# Patient Record
(demographics unavailable — no encounter records)

---

## 2024-11-05 NOTE — HISTORY OF PRESENT ILLNESS
[de-identified] : The patient is a 14 year  old right hand dominant female who presents today complaining of right ankle pain.  Date of Injury/Onset: 11/3/24 Pain:    At Rest: 0/10  With Activity:  8.5/10  Mechanism of injury: While playing basketball she landed and felt her ankle roll and felt a pop  This is NOT a Work Related Injury being treated under Worker's Compensation. This is NOT an athletic injury occurring associated with an interscholastic or organized sports team. Quality of symptoms: sharp lateral and medial ankle pain, swelling  Improves with: rest Worse with: weight bearing, plantarflexion, dorsiflexion  Prior treatment: none - using crutches to ambulate  Prior Imaging: none Out of work/sport: Out of sports since 11/3/24 School/Sport/Position/Occupation: [employer] basketball, soccer  Additional Information: mild R ankle sprain 2022

## 2024-11-05 NOTE — IMAGING
[Right] : right ankle [Weight -] : weightbearing [de-identified] : The patient is a well appearing 14 year  old female of their stated age. Patient ambulates with ASSISTANCE OF CRUTCHES Negative straight leg raise.   Effected Foot and Ankle: RIGHT   Inspection: Erythema: None Ecchymosis: None Abrasions: None Effusion: None Deformity: None Pes [place] Valgus: Negative Pes Cavus: Negative   Palpation: Crepitus: None Medial Malleolus: Nontender Lateral Malleolus: Nontender Syndesmosis: TENDER  Proximal Fibula: Nontender ATFL: TENDER  CFL: TENDER  Deltoid: TENDER  Calcaneus: Nontender Talar Head/Neck: Nontender Peroneal Tendons: Nontender Posterior Tibialis: Nontender Achilles Tendon: Nontender & Intact Anterior Capsule: Nontender Hindfoot: Nontender Midfoot: Nontender Forefoot: Nontender   ROM: Ankle Dorsiflexion: 30 degrees Ankle Plantar Flexion: 45 degrees Motor: Dorsiflexion: 5 out of 5 Plantar Flexion: 5 out of 5 Inversion: 5  out of 5 Eversion: 5 out of 5 EHL: 5 out of 5 FHL: 5 out of 5   Provocative Testing: Anterior Drawer: Negative Syndesmosis Squeeze Test: POSITIVE  Le's Test: Negative Midfoot Stability/Rotation: Negative Heel Raise Inversion: Normal Triple Hop: POSITIVE  Neurologic Exam: L4-S1 Sensation: Grossly Intact Vascular Exam/Pulses: Dorsalis Pedis: 2+ Posterior Tibialis: 2+ Capillary Refill: <2 Seconds Other Exams: None   Pertinent Contralateral Findings: None   Assessment: The patient is a 14-year-old female with right ankle pain and radiographic and physical exam findings consistent with high ankle sprain. The patients condition is acute Documents/Results Reviewed Today: X-Ray right ankle to include stress view  Tests/Studies Independently Interpreted Today: X-Ray right ankle to include stress view is benign, no widening of mortis. Pertinent findings include: tender ATFL, tender CFL, tender deltoid, tender syndesmosis, +triple hop, +syndesmosis squeeze test Confounding medical conditions/concerns: None   Plan: Start physical therapy, HEP, and stretching for high ankle sprain. Recommended the patient obtain a Reparel ankle sleeve and topical Voltaren gel to aid in inflammatory process. The patient will begin use of long Pneumatic CAM boot to ensure stability and proper healing of high ankle sprain. At this time, the patient has no weight bearing restrictions. Discussed appropriate use of OTC anti-inflammatories as needed for pain, inflammation, and discomfort - use as directed and take with food. The patient will follow up in 1 weeks to discuss possible transition into lace-up ankle brace given upcoming basketball try outs in 2 weeks. Until then, the patient is out of gym/sport specific activity. Will monitor on a week to week basis. Modify activity as discussed. Tests Ordered: None  Prescription Medications Ordered: Discussed appropriate use of OTC Aleve one in the morning, two at night Braces/DME Ordered: long pneumatic CAM boot  Activity/Work/Sports Status: Shut down  Additional Instructions: None Follow-Up: 10 days  The patient's current medication management of their orthopedic diagnosis was reviewed today: (1) We discussed a comprehensive treatment plan that included possible pharmaceutical management involving the use of prescription strength medications including but not limited to options such as oral Naprosyn 500mg BID, once daily Meloxicam 15 mg, or 500-650 mg Tylenol versus over the counter oral medications and topical prescription NSAID Pennsaid vs over the counter Voltaren gel.  Based on our extensive discussion, the patient declined prescription medication and will use over the counter Advil, Alleve, Voltaren Gel or Tylenol as directed. (2) There is a moderate risk of morbidity with further treatment, especially from use of prescription strength medications and possible side effects of these medications which include upset stomach with oral medications, skin reactions to topical medications and cardiac/renal issues with long term use. (3) I recommended that the patient follow-up with their medical physician to discuss any significant specific potential issues with long term medication use such as interactions with current medications or with exacerbation of underlying medical comorbidities. (4) The benefits and risks associated with use of injectable, oral or topical, prescription and over the counter anti-inflammatory medications were discussed with the patient. The patient voiced understanding of the risks including but not limited to bleeding, stroke, kidney dysfunction, heart disease, and were referred to the black box warning label for further information.   Katharine PEREZ attest that this documentation has been prepared under the direction and in the presence of Provider Dr. Santy Dumont.   The documentation recorded by the scribe accurately reflects the services IDr. Santy, personally performed and the decisions made by me. [FreeTextEntry9] : X-Ray right ankle to include stress view is benign, no widening of mortis.

## 2024-11-05 NOTE — IMAGING
[Right] : right ankle [Weight -] : weightbearing [de-identified] : The patient is a well appearing 14 year  old female of their stated age. Patient ambulates with ASSISTANCE OF CRUTCHES Negative straight leg raise.   Effected Foot and Ankle: RIGHT   Inspection: Erythema: None Ecchymosis: None Abrasions: None Effusion: None Deformity: None Pes [place] Valgus: Negative Pes Cavus: Negative   Palpation: Crepitus: None Medial Malleolus: Nontender Lateral Malleolus: Nontender Syndesmosis: TENDER  Proximal Fibula: Nontender ATFL: TENDER  CFL: TENDER  Deltoid: TENDER  Calcaneus: Nontender Talar Head/Neck: Nontender Peroneal Tendons: Nontender Posterior Tibialis: Nontender Achilles Tendon: Nontender & Intact Anterior Capsule: Nontender Hindfoot: Nontender Midfoot: Nontender Forefoot: Nontender   ROM: Ankle Dorsiflexion: 30 degrees Ankle Plantar Flexion: 45 degrees Motor: Dorsiflexion: 5 out of 5 Plantar Flexion: 5 out of 5 Inversion: 5  out of 5 Eversion: 5 out of 5 EHL: 5 out of 5 FHL: 5 out of 5   Provocative Testing: Anterior Drawer: Negative Syndesmosis Squeeze Test: POSITIVE  Le's Test: Negative Midfoot Stability/Rotation: Negative Heel Raise Inversion: Normal Triple Hop: POSITIVE  Neurologic Exam: L4-S1 Sensation: Grossly Intact Vascular Exam/Pulses: Dorsalis Pedis: 2+ Posterior Tibialis: 2+ Capillary Refill: <2 Seconds Other Exams: None   Pertinent Contralateral Findings: None   Assessment: The patient is a 14-year-old female with right ankle pain and radiographic and physical exam findings consistent with high ankle sprain. The patients condition is acute Documents/Results Reviewed Today: X-Ray right ankle to include stress view  Tests/Studies Independently Interpreted Today: X-Ray right ankle to include stress view is benign, no widening of mortis. Pertinent findings include: tender ATFL, tender CFL, tender deltoid, tender syndesmosis, +triple hop, +syndesmosis squeeze test Confounding medical conditions/concerns: None   Plan: Start physical therapy, HEP, and stretching for high ankle sprain. Recommended the patient obtain a Reparel ankle sleeve and topical Voltaren gel to aid in inflammatory process. The patient will begin use of long Pneumatic CAM boot to ensure stability and proper healing of high ankle sprain. At this time, the patient has no weight bearing restrictions. Discussed appropriate use of OTC anti-inflammatories as needed for pain, inflammation, and discomfort - use as directed and take with food. The patient will follow up in 1 weeks to discuss possible transition into lace-up ankle brace given upcoming basketball try outs in 2 weeks. Until then, the patient is out of gym/sport specific activity. Will monitor on a week to week basis. Modify activity as discussed. Tests Ordered: None  Prescription Medications Ordered: Discussed appropriate use of OTC Aleve one in the morning, two at night Braces/DME Ordered: long pneumatic CAM boot  Activity/Work/Sports Status: Shut down  Additional Instructions: None Follow-Up: 10 days  The patient's current medication management of their orthopedic diagnosis was reviewed today: (1) We discussed a comprehensive treatment plan that included possible pharmaceutical management involving the use of prescription strength medications including but not limited to options such as oral Naprosyn 500mg BID, once daily Meloxicam 15 mg, or 500-650 mg Tylenol versus over the counter oral medications and topical prescription NSAID Pennsaid vs over the counter Voltaren gel.  Based on our extensive discussion, the patient declined prescription medication and will use over the counter Advil, Alleve, Voltaren Gel or Tylenol as directed. (2) There is a moderate risk of morbidity with further treatment, especially from use of prescription strength medications and possible side effects of these medications which include upset stomach with oral medications, skin reactions to topical medications and cardiac/renal issues with long term use. (3) I recommended that the patient follow-up with their medical physician to discuss any significant specific potential issues with long term medication use such as interactions with current medications or with exacerbation of underlying medical comorbidities. (4) The benefits and risks associated with use of injectable, oral or topical, prescription and over the counter anti-inflammatory medications were discussed with the patient. The patient voiced understanding of the risks including but not limited to bleeding, stroke, kidney dysfunction, heart disease, and were referred to the black box warning label for further information.   Katharine PEREZ attest that this documentation has been prepared under the direction and in the presence of Provider Dr. Santy Dumotn.   The documentation recorded by the scribe accurately reflects the services IDr. Santy, personally performed and the decisions made by me. [FreeTextEntry9] : X-Ray right ankle to include stress view is benign, no widening of mortis.

## 2024-11-05 NOTE — HISTORY OF PRESENT ILLNESS
[de-identified] : The patient is a 14 year  old right hand dominant female who presents today complaining of right ankle pain.  Date of Injury/Onset: 11/3/24 Pain:    At Rest: 0/10  With Activity:  8.5/10  Mechanism of injury: While playing basketball she landed and felt her ankle roll and felt a pop  This is NOT a Work Related Injury being treated under Worker's Compensation. This is NOT an athletic injury occurring associated with an interscholastic or organized sports team. Quality of symptoms: sharp lateral and medial ankle pain, swelling  Improves with: rest Worse with: weight bearing, plantarflexion, dorsiflexion  Prior treatment: none - using crutches to ambulate  Prior Imaging: none Out of work/sport: Out of sports since 11/3/24 School/Sport/Position/Occupation: [employer] basketball, soccer  Additional Information: mild R ankle sprain 2022

## 2024-11-15 NOTE — IMAGING
[de-identified] : The patient is a well appearing 14 year  old female of their stated age. Patient ambulates IN PNEUMATIC CAM BOOT  Negative straight leg raise.   Effected Foot and Ankle: RIGHT   Inspection: Erythema: None Ecchymosis: None Abrasions: None Effusion: None Deformity: None Pes [place] Valgus: Negative Pes Cavus: Negative   Palpation: Crepitus: None Medial Malleolus: Nontender Lateral Malleolus: Nontender Syndesmosis: Nontender Proximal Fibula: Nontender ATFL: Nontender CFL: Nontender Deltoid: Nontender Calcaneus: Nontender Talar Head/Neck: Nontender Peroneal Tendons: Nontender Posterior Tibialis: Nontender Achilles Tendon: Nontender & Intact Anterior Capsule: Nontender Hindfoot: Nontender Midfoot: Nontender Forefoot: Nontender   ROM: Ankle Dorsiflexion: 30 degrees Ankle Plantar Flexion: 45 degrees Motor: Dorsiflexion: 5 out of 5 Plantar Flexion: 5 out of 5 Inversion: 5  out of 5 Eversion: 5 out of 5 EHL: 5 out of 5 FHL: 5 out of 5   Provocative Testing: Anterior Drawer: Negative Syndesmosis Squeeze Test: Negative  Le's Test: Negative Midfoot Stability/Rotation: Negative Heel Raise Inversion: Normal Triple Hop: Negative Neurologic Exam: L4-S1 Sensation: Grossly Intact Vascular Exam/Pulses: Dorsalis Pedis: 2+ Posterior Tibialis: 2+ Capillary Refill: <2 Seconds Other Exams: None   Pertinent Contralateral Findings: None   Assessment: The patient is a 14-year-old female with right ankle pain and radiographic and physical exam findings consistent with high ankle sprain. The patients condition is acute Documents/Results Reviewed Today: None Tests/Studies Independently Interpreted Today: None  Pertinent findings include: No tenderness at this time, -triple hop, - squat jump Confounding medical conditions/concerns: None   Plan: Finish out physical therapy, HEP, and stretching for high ankle sprain. Recommended the patient obtain a Reparel ankle sleeve and topical Voltaren gel to aid in inflammatory process.  The patient will begin use of a lace-up ankle brace to ensure stability and avoid recurrent injury - fitted and dispensed in office today. Discussed appropriate use of OTC anti-inflammatories as needed for pain, inflammation, and discomfort - use as directed and take with food. The patient is cleared for gym and sports in lace up ankle brace. The patient will follow up on a PRN basis unless new symptoms arise. Tests Ordered: None  Prescription Medications Ordered: Discussed appropriate use of OTC Aleve one in the morning, two at night Braces/DME Ordered: lace up ankle brace Activity/Work/Sports Status: cleared in lace up ankle brace Additional Instructions: None Follow-Up: PRN  The patient's current medication management of their orthopedic diagnosis was reviewed today: (1) We discussed a comprehensive treatment plan that included possible pharmaceutical management involving the use of prescription strength medications including but not limited to options such as oral Naprosyn 500mg BID, once daily Meloxicam 15 mg, or 500-650 mg Tylenol versus over the counter oral medications and topical prescription NSAID Pennsaid vs over the counter Voltaren gel.  Based on our extensive discussion, the patient declined prescription medication and will use over the counter Advil, Alleve, Voltaren Gel or Tylenol as directed. (2) There is a moderate risk of morbidity with further treatment, especially from use of prescription strength medications and possible side effects of these medications which include upset stomach with oral medications, skin reactions to topical medications and cardiac/renal issues with long term use. (3) I recommended that the patient follow-up with their medical physician to discuss any significant specific potential issues with long term medication use such as interactions with current medications or with exacerbation of underlying medical comorbidities. (4) The benefits and risks associated with use of injectable, oral or topical, prescription and over the counter anti-inflammatory medications were discussed with the patient. The patient voiced understanding of the risks including but not limited to bleeding, stroke, kidney dysfunction, heart disease, and were referred to the black box warning label for further information.   Mayra PEREZ attest that this documentation has been prepared under the direction and in the presence of Provider Dr. Santy Dumont.  The documentation recorded by the scribe accurately reflects the service CHRIS Rashid PA-C personally performed and the decisions made by me. The patient was seen by me under the direct supervision of Dr. Santy Dumont

## 2024-11-15 NOTE — HISTORY OF PRESENT ILLNESS
[de-identified] : The patient is a 14 year  old right hand dominant female who presents today complaining of right ankle pain.  Date of Injury/Onset: 11/3/24 Pain:    At Rest: 0/10  With Activity:  1-2/10  Mechanism of injury: While playing basketball she landed and felt her ankle roll and felt a pop  This is NOT a Work Related Injury being treated under Worker's Compensation. This is NOT an athletic injury occurring associated with an interscholastic or organized sports team. Quality of symptoms: sharp lateral ankle pain  Improves with: rest Worse with: quick movements  Treatment/Imaging/Studies Since Last Visit: 2x/week at ROM on the [facility]  	Reports Available For Review Today: none Out of work/sport: Out of sports since 11/3/24 School/Sport/Position/Occupation: [employer] basketball, soccer  Changes since last visit: Feeling better. Compliant with boot. In PT 2x/week.  Additional Information: mild R ankle sprain 2022

## 2024-11-15 NOTE — IMAGING
[de-identified] : The patient is a well appearing 14 year  old female of their stated age. Patient ambulates IN PNEUMATIC CAM BOOT  Negative straight leg raise.   Effected Foot and Ankle: RIGHT   Inspection: Erythema: None Ecchymosis: None Abrasions: None Effusion: None Deformity: None Pes [place] Valgus: Negative Pes Cavus: Negative   Palpation: Crepitus: None Medial Malleolus: Nontender Lateral Malleolus: Nontender Syndesmosis: Nontender Proximal Fibula: Nontender ATFL: Nontender CFL: Nontender Deltoid: Nontender Calcaneus: Nontender Talar Head/Neck: Nontender Peroneal Tendons: Nontender Posterior Tibialis: Nontender Achilles Tendon: Nontender & Intact Anterior Capsule: Nontender Hindfoot: Nontender Midfoot: Nontender Forefoot: Nontender   ROM: Ankle Dorsiflexion: 30 degrees Ankle Plantar Flexion: 45 degrees Motor: Dorsiflexion: 5 out of 5 Plantar Flexion: 5 out of 5 Inversion: 5  out of 5 Eversion: 5 out of 5 EHL: 5 out of 5 FHL: 5 out of 5   Provocative Testing: Anterior Drawer: Negative Syndesmosis Squeeze Test: Negative  Le's Test: Negative Midfoot Stability/Rotation: Negative Heel Raise Inversion: Normal Triple Hop: Negative Neurologic Exam: L4-S1 Sensation: Grossly Intact Vascular Exam/Pulses: Dorsalis Pedis: 2+ Posterior Tibialis: 2+ Capillary Refill: <2 Seconds Other Exams: None   Pertinent Contralateral Findings: None   Assessment: The patient is a 14-year-old female with right ankle pain and radiographic and physical exam findings consistent with high ankle sprain. The patients condition is acute Documents/Results Reviewed Today: None Tests/Studies Independently Interpreted Today: None  Pertinent findings include: No tenderness at this time, -triple hop, - squat jump Confounding medical conditions/concerns: None   Plan: Finish out physical therapy, HEP, and stretching for high ankle sprain. Recommended the patient obtain a Reparel ankle sleeve and topical Voltaren gel to aid in inflammatory process.  The patient will begin use of a lace-up ankle brace to ensure stability and avoid recurrent injury - fitted and dispensed in office today. Discussed appropriate use of OTC anti-inflammatories as needed for pain, inflammation, and discomfort - use as directed and take with food. The patient is cleared for gym and sports in lace up ankle brace. The patient will follow up on a PRN basis unless new symptoms arise. Tests Ordered: None  Prescription Medications Ordered: Discussed appropriate use of OTC Aleve one in the morning, two at night Braces/DME Ordered: lace up ankle brace Activity/Work/Sports Status: cleared in lace up ankle brace Additional Instructions: None Follow-Up: PRN  The patient's current medication management of their orthopedic diagnosis was reviewed today: (1) We discussed a comprehensive treatment plan that included possible pharmaceutical management involving the use of prescription strength medications including but not limited to options such as oral Naprosyn 500mg BID, once daily Meloxicam 15 mg, or 500-650 mg Tylenol versus over the counter oral medications and topical prescription NSAID Pennsaid vs over the counter Voltaren gel.  Based on our extensive discussion, the patient declined prescription medication and will use over the counter Advil, Alleve, Voltaren Gel or Tylenol as directed. (2) There is a moderate risk of morbidity with further treatment, especially from use of prescription strength medications and possible side effects of these medications which include upset stomach with oral medications, skin reactions to topical medications and cardiac/renal issues with long term use. (3) I recommended that the patient follow-up with their medical physician to discuss any significant specific potential issues with long term medication use such as interactions with current medications or with exacerbation of underlying medical comorbidities. (4) The benefits and risks associated with use of injectable, oral or topical, prescription and over the counter anti-inflammatory medications were discussed with the patient. The patient voiced understanding of the risks including but not limited to bleeding, stroke, kidney dysfunction, heart disease, and were referred to the black box warning label for further information.   Mayra PEREZ attest that this documentation has been prepared under the direction and in the presence of Provider Dr. Santy Dumont.  The documentation recorded by the scribe accurately reflects the service CHRIS Rashid PA-C personally performed and the decisions made by me. The patient was seen by me under the direct supervision of Dr. Santy Dumont

## 2024-11-15 NOTE — HISTORY OF PRESENT ILLNESS
[de-identified] : The patient is a 14 year  old right hand dominant female who presents today complaining of right ankle pain.  Date of Injury/Onset: 11/3/24 Pain:    At Rest: 0/10  With Activity:  1-2/10  Mechanism of injury: While playing basketball she landed and felt her ankle roll and felt a pop  This is NOT a Work Related Injury being treated under Worker's Compensation. This is NOT an athletic injury occurring associated with an interscholastic or organized sports team. Quality of symptoms: sharp lateral ankle pain  Improves with: rest Worse with: quick movements  Treatment/Imaging/Studies Since Last Visit: 2x/week at ROM on the [facility]  	Reports Available For Review Today: none Out of work/sport: Out of sports since 11/3/24 School/Sport/Position/Occupation: [employer] basketball, soccer  Changes since last visit: Feeling better. Compliant with boot. In PT 2x/week.  Additional Information: mild R ankle sprain 2022